# Patient Record
Sex: FEMALE | Race: BLACK OR AFRICAN AMERICAN
[De-identification: names, ages, dates, MRNs, and addresses within clinical notes are randomized per-mention and may not be internally consistent; named-entity substitution may affect disease eponyms.]

---

## 2023-07-08 ENCOUNTER — NURSE TRIAGE (OUTPATIENT)
Dept: OTHER | Facility: CLINIC | Age: 58
End: 2023-07-08

## 2023-07-08 NOTE — TELEPHONE ENCOUNTER
Location of patient: [place]    Subjective: Caller states \"sinus infection for probably 9 days; taking mucinex and fluids with not getting better\"     Current Symptoms: productive cough with thick yellow mucus    Onset: 9 days ago; worsening    Associated Symptoms:  cough, congestion    Pain Severity:    /10; ;     Temperature: denies any current fever       What has been tried: Mucinex and increased fluids    LMP:     Pregnant:       Recommended disposition: See PCP within 24 Hours    Care advice provided, patient verbalizes understanding; denies any other questions or concerns; instructed to call back for any new or worsening symptoms. Caller agrees to proceed to nearest walk in clinic or urgent care due to after hours/weekend. This triage is a result of a call to [address] [place] [address]. Please do not respond to the triage nurse through this encounter. Any subsequent communication should be directly with the patient.     Reason for Disposition   [1] Continuous (nonstop) coughing interferes with work or school AND [2] no improvement using cough treatment per Care Advice    Protocols used: Cough - Acute Productive-ADULT-